# Patient Record
Sex: MALE | Race: WHITE | NOT HISPANIC OR LATINO | Employment: FULL TIME | URBAN - METROPOLITAN AREA
[De-identification: names, ages, dates, MRNs, and addresses within clinical notes are randomized per-mention and may not be internally consistent; named-entity substitution may affect disease eponyms.]

---

## 2021-05-04 ENCOUNTER — COMMUNICATION - HEALTHEAST (OUTPATIENT)
Dept: SCHEDULING | Facility: CLINIC | Age: 26
End: 2021-05-04

## 2021-05-04 ENCOUNTER — RECORDS - HEALTHEAST (OUTPATIENT)
Dept: LAB | Facility: CLINIC | Age: 26
End: 2021-05-04

## 2021-05-04 LAB
ALBUMIN SERPL-MCNC: 4.4 G/DL (ref 3.5–5)
ALP SERPL-CCNC: 46 U/L (ref 45–120)
ALT SERPL W P-5'-P-CCNC: 80 U/L (ref 0–45)
AST SERPL W P-5'-P-CCNC: 54 U/L (ref 0–40)
BILIRUB DIRECT SERPL-MCNC: 0.3 MG/DL
BILIRUB SERPL-MCNC: 1 MG/DL (ref 0–1)
IRON SATN MFR SERPL: 34 % (ref 20–50)
IRON SERPL-MCNC: 155 UG/DL (ref 42–175)
PROT SERPL-MCNC: 7.8 G/DL (ref 6–8)
TIBC SERPL-MCNC: 455 UG/DL (ref 313–563)
TRANSFERRIN SERPL-MCNC: 364 MG/DL (ref 212–360)

## 2021-05-05 LAB
HBV SURFACE AG SERPL QL IA: NEGATIVE
HCV AB SERPL QL IA: NEGATIVE
HEPATITIS B SURFACE ANTIBODY LHE- HISTORICAL: POSITIVE

## 2021-06-20 ENCOUNTER — HEALTH MAINTENANCE LETTER (OUTPATIENT)
Age: 26
End: 2021-06-20

## 2021-10-11 ENCOUNTER — HEALTH MAINTENANCE LETTER (OUTPATIENT)
Age: 26
End: 2021-10-11

## 2021-12-24 ENCOUNTER — IMMUNIZATION (OUTPATIENT)
Dept: NURSING | Facility: CLINIC | Age: 26
End: 2021-12-24
Payer: COMMERCIAL

## 2021-12-24 PROCEDURE — 0001A PR COVID VAC PFIZER DIL RECON 30 MCG/0.3 ML IM: CPT

## 2021-12-24 PROCEDURE — 91300 PR COVID VAC PFIZER DIL RECON 30 MCG/0.3 ML IM: CPT

## 2022-01-14 ENCOUNTER — IMMUNIZATION (OUTPATIENT)
Dept: NURSING | Facility: CLINIC | Age: 27
End: 2022-01-14
Attending: FAMILY MEDICINE
Payer: COMMERCIAL

## 2022-01-14 PROCEDURE — 91300 PR COVID VAC PFIZER DIL RECON 30 MCG/0.3 ML IM: CPT

## 2022-01-14 PROCEDURE — 0002A PR COVID VAC PFIZER DIL RECON 30 MCG/0.3 ML IM: CPT

## 2022-07-17 ENCOUNTER — HEALTH MAINTENANCE LETTER (OUTPATIENT)
Age: 27
End: 2022-07-17

## 2022-09-25 ENCOUNTER — HEALTH MAINTENANCE LETTER (OUTPATIENT)
Age: 27
End: 2022-09-25

## 2023-08-05 ENCOUNTER — HEALTH MAINTENANCE LETTER (OUTPATIENT)
Age: 28
End: 2023-08-05

## 2024-08-13 ENCOUNTER — HOSPITAL ENCOUNTER (EMERGENCY)
Facility: CLINIC | Age: 29
Discharge: HOME | End: 2024-08-13
Attending: EMERGENCY MEDICINE | Admitting: EMERGENCY MEDICINE
Payer: COMMERCIAL

## 2024-08-13 ENCOUNTER — NURSE TRIAGE (OUTPATIENT)
Dept: FAMILY MEDICINE | Facility: CLINIC | Age: 29
End: 2024-08-13
Payer: COMMERCIAL

## 2024-08-13 VITALS
WEIGHT: 190 LBS | DIASTOLIC BLOOD PRESSURE: 83 MMHG | TEMPERATURE: 98.7 F | HEART RATE: 74 BPM | SYSTOLIC BLOOD PRESSURE: 138 MMHG | RESPIRATION RATE: 18 BRPM | OXYGEN SATURATION: 99 %

## 2024-08-13 DIAGNOSIS — I10 HYPERTENSION, UNSPECIFIED TYPE: ICD-10-CM

## 2024-08-13 DIAGNOSIS — F10.10 ALCOHOL ABUSE: ICD-10-CM

## 2024-08-13 DIAGNOSIS — S36.119A LIVER INJURY, INITIAL ENCOUNTER: ICD-10-CM

## 2024-08-13 PROBLEM — F32.0 MDD (MAJOR DEPRESSIVE DISORDER), SINGLE EPISODE, MILD (H): Status: ACTIVE | Noted: 2024-08-13

## 2024-08-13 PROBLEM — F10.930 ALCOHOL WITHDRAWAL, UNCOMPLICATED (H): Status: ACTIVE | Noted: 2024-08-13

## 2024-08-13 PROBLEM — F41.1 GAD (GENERALIZED ANXIETY DISORDER): Status: ACTIVE | Noted: 2024-08-13

## 2024-08-13 LAB
ALBUMIN SERPL BCG-MCNC: 5.2 G/DL (ref 3.5–5.2)
ALP SERPL-CCNC: 58 U/L (ref 40–150)
ALT SERPL W P-5'-P-CCNC: 139 U/L (ref 0–70)
ANION GAP SERPL CALCULATED.3IONS-SCNC: 12 MMOL/L (ref 7–15)
AST SERPL W P-5'-P-CCNC: 256 U/L (ref 0–45)
BASOPHILS # BLD AUTO: 0.1 10E3/UL (ref 0–0.2)
BASOPHILS NFR BLD AUTO: 1 %
BILIRUB SERPL-MCNC: 1 MG/DL
BUN SERPL-MCNC: 8.4 MG/DL (ref 6–20)
CALCIUM SERPL-MCNC: 10.2 MG/DL (ref 8.8–10.4)
CHLORIDE SERPL-SCNC: 101 MMOL/L (ref 98–107)
CREAT SERPL-MCNC: 0.83 MG/DL (ref 0.67–1.17)
EGFRCR SERPLBLD CKD-EPI 2021: >90 ML/MIN/1.73M2
EOSINOPHIL # BLD AUTO: 0.2 10E3/UL (ref 0–0.7)
EOSINOPHIL NFR BLD AUTO: 5 %
ERYTHROCYTE [DISTWIDTH] IN BLOOD BY AUTOMATED COUNT: 12.5 % (ref 10–15)
ETHANOL SERPL-MCNC: <0.01 G/DL
GLUCOSE SERPL-MCNC: 100 MG/DL (ref 70–99)
HCO3 SERPL-SCNC: 27 MMOL/L (ref 22–29)
HCT VFR BLD AUTO: 41.4 % (ref 40–53)
HGB BLD-MCNC: 14.9 G/DL (ref 13.3–17.7)
IMM GRANULOCYTES # BLD: 0 10E3/UL
IMM GRANULOCYTES NFR BLD: 0 %
LIPASE SERPL-CCNC: 48 U/L (ref 13–60)
LYMPHOCYTES # BLD AUTO: 1.2 10E3/UL (ref 0.8–5.3)
LYMPHOCYTES NFR BLD AUTO: 31 %
MCH RBC QN AUTO: 33.3 PG (ref 26.5–33)
MCHC RBC AUTO-ENTMCNC: 36 G/DL (ref 31.5–36.5)
MCV RBC AUTO: 92 FL (ref 78–100)
MONOCYTES # BLD AUTO: 0.6 10E3/UL (ref 0–1.3)
MONOCYTES NFR BLD AUTO: 14 %
NEUTROPHILS # BLD AUTO: 2 10E3/UL (ref 1.6–8.3)
NEUTROPHILS NFR BLD AUTO: 49 %
NRBC # BLD AUTO: 0 10E3/UL
NRBC BLD AUTO-RTO: 0 /100
PLATELET # BLD AUTO: 242 10E3/UL (ref 150–450)
POTASSIUM SERPL-SCNC: 4.2 MMOL/L (ref 3.4–5.3)
PROT SERPL-MCNC: 8.2 G/DL (ref 6.4–8.3)
RBC # BLD AUTO: 4.48 10E6/UL (ref 4.4–5.9)
SODIUM SERPL-SCNC: 140 MMOL/L (ref 135–145)
WBC # BLD AUTO: 4 10E3/UL (ref 4–11)

## 2024-08-13 PROCEDURE — 83690 ASSAY OF LIPASE: CPT | Performed by: EMERGENCY MEDICINE

## 2024-08-13 PROCEDURE — 258N000003 HC RX IP 258 OP 636: Performed by: EMERGENCY MEDICINE

## 2024-08-13 PROCEDURE — 82077 ASSAY SPEC XCP UR&BREATH IA: CPT | Performed by: EMERGENCY MEDICINE

## 2024-08-13 PROCEDURE — 85049 AUTOMATED PLATELET COUNT: CPT | Performed by: EMERGENCY MEDICINE

## 2024-08-13 PROCEDURE — 250N000011 HC RX IP 250 OP 636: Performed by: EMERGENCY MEDICINE

## 2024-08-13 PROCEDURE — 80053 COMPREHEN METABOLIC PANEL: CPT | Performed by: EMERGENCY MEDICINE

## 2024-08-13 PROCEDURE — 99284 EMERGENCY DEPT VISIT MOD MDM: CPT | Mod: 25

## 2024-08-13 PROCEDURE — 96374 THER/PROPH/DIAG INJ IV PUSH: CPT

## 2024-08-13 PROCEDURE — 250N000013 HC RX MED GY IP 250 OP 250 PS 637: Performed by: EMERGENCY MEDICINE

## 2024-08-13 PROCEDURE — 96361 HYDRATE IV INFUSION ADD-ON: CPT

## 2024-08-13 PROCEDURE — 36415 COLL VENOUS BLD VENIPUNCTURE: CPT | Performed by: EMERGENCY MEDICINE

## 2024-08-13 RX ORDER — DIAZEPAM 5 MG
5 TABLET ORAL ONCE
Status: COMPLETED | OUTPATIENT
Start: 2024-08-13 | End: 2024-08-13

## 2024-08-13 RX ORDER — ONDANSETRON 4 MG/1
4 TABLET, ORALLY DISINTEGRATING ORAL EVERY 8 HOURS PRN
Qty: 10 TABLET | Refills: 0 | Status: SHIPPED | OUTPATIENT
Start: 2024-08-13 | End: 2024-08-13

## 2024-08-13 RX ORDER — ONDANSETRON 2 MG/ML
4 INJECTION INTRAMUSCULAR; INTRAVENOUS EVERY 30 MIN PRN
Status: DISCONTINUED | OUTPATIENT
Start: 2024-08-13 | End: 2024-08-13 | Stop reason: HOSPADM

## 2024-08-13 RX ORDER — DIAZEPAM 5 MG
5 TABLET ORAL EVERY 6 HOURS PRN
Qty: 12 TABLET | Refills: 0 | Status: SHIPPED | OUTPATIENT
Start: 2024-08-13

## 2024-08-13 RX ORDER — ONDANSETRON 4 MG/1
4 TABLET, ORALLY DISINTEGRATING ORAL EVERY 8 HOURS PRN
Qty: 10 TABLET | Refills: 0 | Status: SHIPPED | OUTPATIENT
Start: 2024-08-13

## 2024-08-13 RX ADMIN — DIAZEPAM 5 MG: 5 TABLET ORAL at 10:53

## 2024-08-13 RX ADMIN — SODIUM CHLORIDE 1000 ML: 9 INJECTION, SOLUTION INTRAVENOUS at 10:20

## 2024-08-13 RX ADMIN — ONDANSETRON 4 MG: 2 INJECTION INTRAMUSCULAR; INTRAVENOUS at 10:20

## 2024-08-13 ASSESSMENT — ACTIVITIES OF DAILY LIVING (ADL)
ADLS_ACUITY_SCORE: 35

## 2024-08-13 ASSESSMENT — LIFESTYLE VARIABLES
AUDITORY DISTURBANCES: NOT PRESENT
PAROXYSMAL SWEATS: NO SWEAT VISIBLE
HEADACHE, FULLNESS IN HEAD: VERY MILD
ORIENTATION AND CLOUDING OF SENSORIUM: ORIENTED AND CAN DO SERIAL ADDITIONS
NAUSEA AND VOMITING: MILD NAUSEA WITH NO VOMITING
VISUAL DISTURBANCES: NOT PRESENT
AGITATION: NORMAL ACTIVITY
ANXIETY: 3
TREMOR: MODERATE, WITH PATIENT'S ARMS EXTENDED
TOTAL SCORE: 9

## 2024-08-13 ASSESSMENT — COLUMBIA-SUICIDE SEVERITY RATING SCALE - C-SSRS
6. HAVE YOU EVER DONE ANYTHING, STARTED TO DO ANYTHING, OR PREPARED TO DO ANYTHING TO END YOUR LIFE?: NO
2. HAVE YOU ACTUALLY HAD ANY THOUGHTS OF KILLING YOURSELF IN THE PAST MONTH?: NO
1. IN THE PAST MONTH, HAVE YOU WISHED YOU WERE DEAD OR WISHED YOU COULD GO TO SLEEP AND NOT WAKE UP?: NO

## 2024-08-13 NOTE — ED PROVIDER NOTES
EMERGENCY DEPARTMENT ENCOUNTER            IMPRESSION:  Alcohol abuse  Hypertension secondary to alcohol abuse  Liver injury secondary to alcohol abuse        MEDICAL DECISION MAKING:  It was my pleasure to provide care for Noe Herman who presented for evaluation of symptoms secondary to alcohol withdrawal    On my exam patient is pleasant and cooperative.   Vital signs show hypertension.  Physical exam notable for patient is awake and alert.  There is no tremor..     Valium and IV fluid administered for symptom relief.      Laboratory investigation independently interpreted by myself and notable for elevated liver function test.  Negative BAL    DEC was consulted for chemical dependency    Patient was reevaluated and results were discussed.     ED evaluation is consistent with alcohol abuse disorder.      I recommended outpatient follow-up.  Zofran prescribed      Prior to making a final disposition on this patient the results of patient's tests and other diagnostic studies were discussed with the patient. All questions were answered. Patient expressed understanding of the plan and was amenable.    Return precautions and follow-up were discussed.     =================================================================  CHIEF COMPLAINT:  Chief Complaint   Patient presents with    Vomiting    Alcohol Problem         HPI  Noe Herman is a 28 year old male with a history of post concussive syndrome who presents to the ED by private car for evaluation of an alcohol problem.    The patient reports that he has recently been binge-drinking, going through a 750 ml bottle of vodka every two days this past week. Most recent drink was yesterday evening around 7 PM. Since he stopped drinking, he endorses nausea, vomiting, dehydration, and anxiety. The patient notes a history of high blood pressure and reports a recent severe headache as well as bilateral arm tingling. This, along with his other symptoms, led him to call  the triage Nurse this morning who recommended he come to the ED for further evaluation. He first tried taking hydroxyzine as well as some dramamine, but this made him feel as if he was going to pass out so they decided to come in. No other concerns at this time.    REVIEW OF SYSTEMS  Constitutional: Does not report chills, unintentional weight loss or fatigue. Positive for dehydration and anxiety.  Eyes: Does not report visual changes or discharge    HENT: Does not report sore throat, ear pain or neck pain  Respiratory: Does not report cough or shortness of breath    Cardiovascular: Does not report chest pain, palpitations or leg swelling  GI: Does not report abdominal pain or dark, bloody stools. Positive for nausea and vomiting.  : Does not report hematuria, dysuria, or flank pain  Musculoskeletal: Does not report any new musculoskeletal pain or new muscle/joint pains  Skin: Does not report rash or wound  Neurologic: Does not report new weakness, focal weakness. Positive for a headache and bilateral arm tingling.      Remainder of systems reviewed, unless noted in HPI all others negative.      PAST MEDICAL HISTORY:  Past Medical History:   Diagnosis Date    Concussion        PAST SURGICAL HISTORY:  History reviewed. No pertinent surgical history.      CURRENT MEDICATIONS:    ibuprofen (ADVIL,MOTRIN) 200 MG tablet        ALLERGIES:  No Known Allergies    FAMILY HISTORY:  History reviewed. No pertinent family history.    SOCIAL HISTORY:   Social History     Socioeconomic History    Marital status:    Tobacco Use    Smoking status: Never       PHYSICAL EXAM:    BP (!) 148/92   Pulse 86   Temp 98.7  F (37.1  C) (Temporal)   Resp 18   Wt 86.2 kg (190 lb)   SpO2 100%     Constitutional: Awake, alert, he is pleasant and cooperative  Head: Normocephalic, atraumatic.  ENT: Mucous membranes are moist.  No pallor.   Eyes: Pupils are reactive.  No discoloration.  No nystagmus  Neck: No lymphadenopathy, no  stridor, supple, no soft tissue swelling  Chest: No tenderness   Respiratory: Respirations even, unlabored. Lungs clear to ascultation bilaterally, in no acute respiratory distress.  Cardiovascular: Regular rate and rhythm.  Good overall perfusion.  Upper and lower extremity pulses are equal.  GI: Abdomen soft, non-tender to palpation.  No guarding or rebound. Bowel sounds present throughout.   Back: No CVA tenderness.    Musculoskeletal: No tremor  Integument: Warm, dry. No rash. No bruising or petechiae.  Neurologic: Alert & oriented x 3. Normal speech. Grossly normal motor and sensory function. No focal deficits noted.  Cranial nerves intact. Steady gait and station. NIHSS = 0  Psychiatric: Normal mood and affect.  Appropriate judgement.    ED COURSE:  10:03 AM Met with and introduced myself to the patient. Discussed history and plan of care.    Medical Decision Making    History:  Supplemental history from: Patient's wife  External Record(s) reviewed: External medical records including care everywhere reviewed.  Reviewed [facility] Nurse Triage note from 8/13/2024 where the patient called for evaluation of an alcohol-related problem. Patient reported trying to stop drinking Since Sunday (8/11) and woke up this morning feeling anxious. Endorses nausea and dehydration. No SI. Recommended patient go to the ED for further evaluation.     Work Up:  Laboratory studies as ordered were independently interpreted by myself.   Broad differential diagnosis considered for alcohol withdrawal  The patient's presentation was of high complexity.     External consultation:  Discussion of management with another provider: Chemical dependency    Complicating factors:  Patient has a complicated past medical history including post-concussive syndrome.  Care affected by social determinants of health: Access to primary care    Disposition involved shared decision-making with the patient.      The patient was prescribed  medication Zofran    Patient otherwise to continue outpatient medications as prescribed.       LAB:  Laboratory results were independently reviewed and interpreted  Results for orders placed or performed during the hospital encounter of 08/13/24   Comprehensive metabolic panel   Result Value Ref Range    Sodium 140 135 - 145 mmol/L    Potassium 4.2 3.4 - 5.3 mmol/L    Carbon Dioxide (CO2) 27 22 - 29 mmol/L    Anion Gap 12 7 - 15 mmol/L    Urea Nitrogen 8.4 6.0 - 20.0 mg/dL    Creatinine 0.83 0.67 - 1.17 mg/dL    GFR Estimate >90 >60 mL/min/1.73m2    Calcium 10.2 8.8 - 10.4 mg/dL    Chloride 101 98 - 107 mmol/L    Glucose 100 (H) 70 - 99 mg/dL    Alkaline Phosphatase 58 40 - 150 U/L     (H) 0 - 45 U/L     (H) 0 - 70 U/L    Protein Total 8.2 6.4 - 8.3 g/dL    Albumin 5.2 3.5 - 5.2 g/dL    Bilirubin Total 1.0 <=1.2 mg/dL   Result Value Ref Range    Lipase 48 13 - 60 U/L   CBC with platelets and differential   Result Value Ref Range    WBC Count 4.0 4.0 - 11.0 10e3/uL    RBC Count 4.48 4.40 - 5.90 10e6/uL    Hemoglobin 14.9 13.3 - 17.7 g/dL    Hematocrit 41.4 40.0 - 53.0 %    MCV 92 78 - 100 fL    MCH 33.3 (H) 26.5 - 33.0 pg    MCHC 36.0 31.5 - 36.5 g/dL    RDW 12.5 10.0 - 15.0 %    Platelet Count 242 150 - 450 10e3/uL    % Neutrophils 49 %    % Lymphocytes 31 %    % Monocytes 14 %    % Eosinophils 5 %    % Basophils 1 %    % Immature Granulocytes 0 %    NRBCs per 100 WBC 0 <1 /100    Absolute Neutrophils 2.0 1.6 - 8.3 10e3/uL    Absolute Lymphocytes 1.2 0.8 - 5.3 10e3/uL    Absolute Monocytes 0.6 0.0 - 1.3 10e3/uL    Absolute Eosinophils 0.2 0.0 - 0.7 10e3/uL    Absolute Basophils 0.1 0.0 - 0.2 10e3/uL    Absolute Immature Granulocytes 0.0 <=0.4 10e3/uL    Absolute NRBCs 0.0 10e3/uL   Ethyl Alcohol Level   Result Value Ref Range    Alcohol ethyl <0.01 <=0.01 g/dL               MEDICATIONS GIVEN IN THE EMERGENCY:  Medications   ondansetron (ZOFRAN) injection 4 mg (4 mg Intravenous $Given 8/13/24 1020)    sodium chloride 0.9% BOLUS 1,000 mL (0 mLs Intravenous Stopped 8/13/24 1320)   diazepam (VALIUM) tablet 5 mg (5 mg Oral $Given 8/13/24 1053)           NEW PRESCRIPTIONS STARTED AT TODAY'S ER VISIT:  New Prescriptions    No medications on file                FINAL DIAGNOSIS:    ICD-10-CM    1. Alcohol abuse  F10.10       2. Hypertension, unspecified type  I10       3. Liver injury, initial encounter  S36.119A                  NAME: Noe Herman  AGE: 28 year old male  YOB: 1995  MRN: [id number]  EVALUATION DATE & TIME: No admission date for patient encounter.    PCP: Jocelin Guillory    ED PROVIDER: Boubacar Centeno M.D.      ILaina, am serving as a scribe to document services personally performed by Dr. Boubacar Centeno based on my observation and the provider's statements to me. I, Boubacar Centeno MD attest that Laina Larry is acting in a scribe capacity, has observed my performance of the services and has documented them in accordance with my direction.    Boubacar Centeno M.D.  Emergency Medicine  [facility] EMERGENCY ROOM  [address] [id number] [phone]  Dept: [phone]  8/13/2024         Boubacar Centeno MD  08/17/24 0718

## 2024-08-13 NOTE — CONSULTS
Diagnostic Evaluation Consultation  Crisis Assessment    Patient Name: Noe Herman  Age:  28 year old  Legal Sex: male  Gender Identity: male  Pronouns:   Race: White  Ethnicity: Not  or   Language: English      Patient was assessed: Phone   Crisis Assessment Start Date: 08/13/24  Crisis Assessment Start Time: 1452  Crisis Assessment Stop Time: 1518  Patient location: [facility] EMERGENCY ROOM                                 Referral Data and Chief Complaint  Noe Herman presents to the ED with family/friends. Patient is presenting to the ED for the following concerns: Health stressors, Depression, Significant behavioral change, Anxiety, Substance use, Recent loss.   Factors that make the mental health crisis life threatening or complex are:  Pt has been drinking daily and having withdrawal issues, including feeling anxious, dizzy, high blood pressure, and shakiness.  Pt had recent stressors including moving to a new house and death in the family.  Pt has no current outpatient mental health service providers..      Informed Consent and Assessment Methods  Explained the crisis assessment process, including applicable information disclosures and limits to confidentiality, assessed understanding of the process, and obtained consent to proceed with the assessment.  Assessment methods included conducting a formal interview with patient, review of medical records, collaboration with medical staff, and obtaining relevant collateral information from family and community providers when available.  : done     Patient response to interventions: eager to participate, acceptance expressed, verbalizes understanding  Coping skills were attempted to reduce the crisis:  fishing, hunting, playing golf, taking walks, watching TV, movies and listening to music     History of the Crisis   Pt is a 28 year old White male with history of depression, anxiety and alcohol abuse.  Pt was  "brought to the ER today by his wife due to worsening of feeling dizzy, high blood pressure, panic attacks and anxiety.  Pt stated, \"Everytime I stood up, I got dizzy and my blood pressure was high.\" as his reason for visiting the ER today.  Pt noted he has history of hypertension as this happened to him before.  Pt also identified ongoing struggle with alcohol abuse, stress of moving recently and death in the family as triggers leading to his current mental health crisis.  Pt shared his brother's father in-law whom he was closed to .  Pt reported he has been drinking 750ml of Vodka daily and rarely using THC drink.  Pt endorsed baseline depression but increased anxiety.  Pt reported having poor sleep but normal appetite.  Pt denied having paranoia and visual hallucination.  Pt endorsed visual hallucination of seeing someone walking by when he was at home alone or at work.  Pt denied having suicidal and homicidal ideations.  Pt reported having access to firearms.  Pt denied having SIB and previous suicide attempt.    Brief Psychosocial History  Family:  , Children yes  Support System:  Wife  Employment Status:  employed full-time  Source of Income:  salary/wages  Financial Environmental Concerns:  none  Current Hobbies:  exercise/fitness, music, social media/computer activities, television/movies/videos, outdoor activities, family functions, arts/crafts  Barriers in Personal Life:  behavioral concerns, mental health concerns, emotional concerns, medical conditions/precautions    Significant Clinical History  Current Anxiety Symptoms:  panic attack, anxious, racing thoughts, excessive worry  Current Depression/Trauma:  sadness, impaired decision making, withdrawl/isolation, crying or feels like crying  Current Somatic Symptoms:  excessive worry, anxious, racing thoughts  Current Psychosis/Thought Disturbance:  impulsive, high risk behavior, visual hallucinations  Current Eating Symptoms:     Chemical Use " History:  Alcohol: Daily, Binge  Last Use:: 08/12/24  Benzodiazepines: None  Opiates: None  Cocaine: None  Marijuana: Occasional (Pt reported drinking THC beverage about 1x 2 months and his last THC drink was about a month ago.)  Other Use: None  Withdrawal Symptoms: Nausea, Other (comments) (vomiting, dizzy and tingling.)   Past diagnosis:  Anxiety Disorder, Substance Use Disorder  Family history:  Substance Use Disorder  Past treatment:  Psychiatric Medication Management  Details of most recent treatment:  Pt reported no recent treatment.  Pt has no current outpatient mental health service providers.  Pt has no history of detox service, CD treatment and psychiatric hospitalization.  Other relevant history:  Pt shared his parents were , has 2 brothers and 1 sister.  Pt reported he was  with 2 children and worked full-time as sub contractor for [employer].  Pt identified hypertension as his medical condition.  Pt denied history of legal issues and being abused.       Collateral Information  Is there collateral information: Yes     Collateral information name, relationship, phone number:  Wife, Yancy Herman [phone]    What happened today: Yancy reported Pt has ongoing drinking problem.  Yancy reported Pt has been binge drinking for past 2 days and he tried to slow down yesterday as he had to go to work.  Yancy reported Pt started to have withdrawal symptoms, including shaking, shievering, tingling and nausea which lead him to the ER visit today.     What is different about patient's functioning: Yancy reported Pt has been lying about things, hiding from her and being late to work due to drinking problem.     Concern about alcohol/drug use:      What do you think the patient needs:      Has patient made comments about wanting to kill themselves/others: no    If d/c is recommended, can they take part in safety/aftercare planning:  yes    Additional collateral information:  Yancy  reviewed and agreed with outpatient service recommendations as she felt safe taking Pt back home today.     Risk Assessment  [place] Suicide Severity Rating Scale Full Clinical Version:  Suicidal Ideation  Q1 Wish to be Dead (Lifetime): No  Q2 Non-Specific Active Suicidal Thoughts (Lifetime): No  Q6 Suicide Behavior (Lifetime): no     Suicidal Behavior (Lifetime)  Actual Attempt (Lifetime): No  Has subject engaged in non-suicidal self-injurious behavior? (Lifetime): No  Interrupted Attempts (Lifetime): No  Aborted or Self-Interrupted Attempt (Lifetime): No  Preparatory Acts or Behavior (Lifetime): No    [place] Suicide Severity Rating Scale Recent:   Suicidal Ideation (Recent)  Q1 Wished to be Dead (Past Month): no  Q2 Suicidal Thoughts (Past Month): no  Level of Risk per Screen: no risks indicated     Suicidal Behavior (Recent)  Actual Attempt (Past 3 Months): No  Has subject engaged in non-suicidal self-injurious behavior? (Past 3 Months): No  Interrupted Attempts (Past 3 Months): No  Aborted or Self-Interrupted Attempt (Past 3 Months): No  Preparatory Acts or Behavior (Past 3 Months): No    Environmental or Psychosocial Events: loss of a loved one, helplessness/hopelessness, impulsivity/recklessness, other life stressors, recent life events (see comment), ongoing abuse of substances  Protective Factors: Protective Factors: intact marriage or domestic partnership, responsibilities and duties to others, including pets and children, lives in a responsibly safe and stable environment, help seeking, strong bond to family unit, community support, or employment, constructive use of leisure time, enjoyable activities, resilience, reality testing ability    Does the patient have thoughts of harming others? Feels Like Hurting Others: no  Previous Attempt to Hurt Others: no  Current presentation:  (Pt was calm, alert, oriented, engaged and cooperative.)  Is the patient engaging in sexually inappropriate behavior?:  no    Is the patient engaging in sexually inappropriate behavior?  no        Mental Status Exam   Affect: Other (unable to assess due to phone assessment.)  Appearance: Other (please comment) (unable to assess due to phone assessment.)  Attention Span/Concentration: Attentive  Eye Contact: Other (please comment) (unable to assess due to phone assessment.)    Fund of Knowledge: Appropriate   Language /Speech Content: Fluent  Language /Speech Volume: Normal  Language /Speech Rate/Productions: Normal  Recent Memory: Variable  Remote Memory: Variable  Mood: Anxious, Depressed, Sad, Apathetic  Orientation to Person: Yes   Orientation to Place: Yes  Orientation to Time of Day: Yes  Orientation to Date: Yes     Situation (Do they understand why they are here?): Yes  Psychomotor Behavior: Other (please comment) (unable to assess due to phone assessment.)  Thought Content: Clear, Hallucinations  Thought Form: Intact     Mini-Cog Assessment  Number of Words Recalled:    Clock-Drawing Test:     Three Item Recall:    Mini-Cog Total Score:       Medication  Psychotropic medications:   Medication Orders - Psychiatric (From admission, onward)      Start     Dose/Rate Route Frequency Ordered Stop    08/13/24 0000  diazepam (VALIUM) 5 MG tablet         5 mg Oral EVERY 6 HOURS PRN 08/13/24 1530               Current Care Team  Patient Care Team:  Jocelin Guillory MD as PCP - General (Family Practice)    Diagnosis  Patient Active Problem List   Diagnosis Code    MDD (major depressive disorder), single episode, mild (H24) F32.0    YOSI (generalized anxiety disorder) F41.1    Alcohol withdrawal, uncomplicated (H) F10.930       Primary Problem This Admission  Active Hospital Problems    YOSI (generalized anxiety disorder)      MDD (major depressive disorder), single episode, mild (H24)      Alcohol withdrawal, uncomplicated (H)        Clinical Summary and Substantiation of Recommendations   Pt presenting in the ER today due  to worsening of panic attacks, anxiety, alcohol abuse, dizziness and hypertension.  Pt reported he has been drinking alcohol daily and binge drinking for last 2 days.  Pt shared drinking alcohol for recreational, social and stress management reasons.  Pt also identified ongoing struggle with alcohol abuse, stress of moving recently and death in the family as triggers leading to his current mental health crisis. Pt shared his brother's father in-law whom he was closed to . Pt reported he has been drinking 750ml of Vodka daily and rarely using THC drink. Pt endorsed baseline depression but increased anxiety. Pt reported having poor sleep but normal appetite. Pt denied having paranoia and visual hallucination. Pt endorsed visual hallucination of seeing someone walking by when he was at home alone or at work. Pt denied having suicidal and homicidal ideations. Pt reported having access to firearms. Pt denied having SIB and previous suicide attempt.  Pt was able to engage in his [facility] Aftercare plan as he felt safe to return home.  Pt was not imminent danger to himself or to others.  Pt was able to identify his coping skills and support system to mitigate his current mental health crisis.  Pt was appropriate for CD Assessment/treatment with outpatient mental health service providers.                          Patient coping skills attempted to reduce the crisis:  fishing, hunting, playing golf, taking walks, watching TV, movies and listening to music    Disposition  Recommended disposition: Individual Therapy, Medication Management, Substance Abuse Disorder Treatment, Rule 25/AB Assessment        Reviewed case and recommendations with attending provider. Attending Name: Heaven Yates MD       Attending concurs with disposition: yes       Patient and/or validated legal guardian concurs with disposition:   yes       Final disposition:  discharge    Legal status on admission:      Assessment Details   Total duration  spent with the patient: 26 min     CPT code(s) utilized: Non-Billable    DEBRA Biggs, Psychotherapist  [facility] - Triage & Transition Services  Callback: [phone]

## 2024-08-13 NOTE — ED TRIAGE NOTES
"Pt with hx of drinking approx a small bottle of vodka a day but cuts back during the week c/o vomiting since Monday morning after drinking more alcohol than normal related to a family death. Last drink yesterday at 6pm. Pt also reports SOB for a few days that is worse when he pays attention to his breathing since the family . Chest feels heavy but no pain since yesterday. Called triage nurse this morning and took dramamine and old hydroxide after talking to triage nurse today for bilateral arm tingling starting yesterday which is similar when he detoxes. After taking the medications he has been having lightheadedness and HA. Has been having headaches since Friday even before the death of the family member. Pt reports neck, arms, and full body cramping for \"a while\".       Triage Assessment (Adult)       Row Name [id number]          Triage Assessment    Airway WDL WDL        Respiratory WDL    Respiratory WDL X;rhythm/pattern     Rhythm/Pattern, Respiratory shortness of breath  SOB for past few days constantly when paying attention to breathing        Skin Circulation/Temperature WDL    Skin Circulation/Temperature WDL WDL        Cardiac WDL    Cardiac WDL WDL        Peripheral/Neurovascular WDL    Peripheral Neurovascular WDL WDL        Cognitive/Neuro/Behavioral WDL    Cognitive/Neuro/Behavioral WDL WDL                     "

## 2024-08-13 NOTE — ED PROVIDER NOTES
EMERGENCY DEPARTMENT SIGNOUT NOTE    Patient signed out to me from Dr. Centeno.      Noe Herman is a 28 year old male          RADIOLOGY/LABS:  Reviewed all pertinent imaging. Please see official radiology report. All pertinent labs reviewed and interpreted.    Results for orders placed or performed during the hospital encounter of 08/13/24   Comprehensive metabolic panel   Result Value Ref Range    Sodium 140 135 - 145 mmol/L    Potassium 4.2 3.4 - 5.3 mmol/L    Carbon Dioxide (CO2) 27 22 - 29 mmol/L    Anion Gap 12 7 - 15 mmol/L    Urea Nitrogen 8.4 6.0 - 20.0 mg/dL    Creatinine 0.83 0.67 - 1.17 mg/dL    GFR Estimate >90 >60 mL/min/1.73m2    Calcium 10.2 8.8 - 10.4 mg/dL    Chloride 101 98 - 107 mmol/L    Glucose 100 (H) 70 - 99 mg/dL    Alkaline Phosphatase 58 40 - 150 U/L     (H) 0 - 45 U/L     (H) 0 - 70 U/L    Protein Total 8.2 6.4 - 8.3 g/dL    Albumin 5.2 3.5 - 5.2 g/dL    Bilirubin Total 1.0 <=1.2 mg/dL   Result Value Ref Range    Lipase 48 13 - 60 U/L   CBC with platelets and differential   Result Value Ref Range    WBC Count 4.0 4.0 - 11.0 10e3/uL    RBC Count 4.48 4.40 - 5.90 10e6/uL    Hemoglobin 14.9 13.3 - 17.7 g/dL    Hematocrit 41.4 40.0 - 53.0 %    MCV 92 78 - 100 fL    MCH 33.3 (H) 26.5 - 33.0 pg    MCHC 36.0 31.5 - 36.5 g/dL    RDW 12.5 10.0 - 15.0 %    Platelet Count 242 150 - 450 10e3/uL    % Neutrophils 49 %    % Lymphocytes 31 %    % Monocytes 14 %    % Eosinophils 5 %    % Basophils 1 %    % Immature Granulocytes 0 %    NRBCs per 100 WBC 0 <1 /100    Absolute Neutrophils 2.0 1.6 - 8.3 10e3/uL    Absolute Lymphocytes 1.2 0.8 - 5.3 10e3/uL    Absolute Monocytes 0.6 0.0 - 1.3 10e3/uL    Absolute Eosinophils 0.2 0.0 - 0.7 10e3/uL    Absolute Basophils 0.1 0.0 - 0.2 10e3/uL    Absolute Immature Granulocytes 0.0 <=0.4 10e3/uL    Absolute NRBCs 0.0 10e3/uL   Ethyl Alcohol Level   Result Value Ref Range    Alcohol ethyl <0.01 <=0.01 g/dL     ED COURSE :      ED Course as of  08/13/24 1531   Tue Aug 13, 2024   1529 Spoke w Sajan, DEC. Recent stressors after family member passed away, recently moved to [facility]. Struggling w etoh. No SI/HI. Ok to safety plan and home w wife.  New chem dep treatment appointment.        FINAL IMPRESSION:  1. Alcohol abuse    2. Hypertension, unspecified type    3. Liver injury, initial encounter    Heaven Yates MD  Emergency Medicine  [facility] EMERGENCY ROOM  [address] [id number] [phone]  Dept: [phone]     Heaven Yates MD  08/13/24 1531

## 2024-08-13 NOTE — TELEPHONE ENCOUNTER
Nurse Triage SBAR    Is this a 2nd Level Triage? Yes- patient is not established at     Situation: Noe is feeling increased anxiety.  He is withdrawing from alcohol (last drank on Sunday).      Background: Noe is trying to stop drinking since Sunday.  He awoke this morning feeling very anxious.     Assessment: Anxiety that he rates a 9.  He does not feel like he wants to harm himself or others.  He is nauseated and very dehydrated.  He has chest discomfort, tingling in his arms, headache, feels shaky and has diarrhea.  He denies abdominal pain, breathing difficulty and sounds alert and oriented, speaking clearly.      Protocol Recommended Disposition:   Go To ED/UCC Now (Or To Office With PCP Approval)    Recommendation: Patient told to go to ER.  He will go to [facility] at .  Explained that it will open at 10:00 am.  His wife will drive.     Patient agreeable to being seen, he will go to the [facility] at [facility]    Does the patient meet one of the following criteria for ADS visit consideration? 16+ years old, with an [facility] PCP     TIP  Providers, please consider if this condition is appropriate for management at one of our Acute and Diagnostic Services sites.     If patient is a good candidate, please use dotphrase <dot>triageresponse and select Refer to ADS to document.     Alcohol withdrawal symptoms.  Headaches, arms are tingling, increased anxiety, BP has been high.    Symptoms of headache, arms are tingling, chest discomfort, nausea, dry mouth.    Reason for Disposition   Alcohol or drug use, known or suspected, and feeling very shaky (i.e., visible tremors of hands)    Additional Information   Negative: SEVERE difficulty breathing (e.g., struggling for each breath, speaks in single words)   Negative: Bluish (or gray) lips or face   Negative: Difficult to awaken or acting confused (e.g., disoriented, slurred speech)   Negative: Hysterical or combative behavior   Negative: Sounds like a life-threatening  "emergency to the triager   Negative: Chest pain   Negative: Palpitations, skipped heartbeat, or rapid heartbeat is main symptom   Negative: Cough is main symptom   Negative: Suicide thoughts, threats, attempts, or questions   Negative: Depression is main problem or symptom (e.g., feelings of sadness or hopelessness)   Negative: Difficulty breathing and persists > 10 minutes and not relieved by reassurance provided by triager   Negative: Lightheadedness or dizziness and persists > 10 minutes and not relieved by reassurance provided by triager    Answer Assessment - Initial Assessment Questions  1. CONCERN: \"Did anything happen that prompted you to call today?\"       Arms are tingling, chest discomfort, headache, nausea and dry mouth.  He is trying to cut alcohol.  2. ANXIETY SYMPTOMS: \"Can you describe how you (your loved one; patient) have been feeling?\" (e.g., tense, restless, panicky, anxious, keyed up, overwhelmed, sense of impending doom).       anxious  3. ONSET: \"How long have you been feeling this way?\" (e.g., hours, days, weeks)      Since he awoke at 0600.  4. SEVERITY: \"How would you rate the level of anxiety?\" (e.g., 0 - 10; or mild, moderate, severe).      9  5. FUNCTIONAL IMPAIRMENT: \"How have these feelings affected your ability to do daily activities?\" \"Have you had more difficulty than usual doing your normal daily activities?\" (e.g., getting better, same, worse; self-care, school, work, interactions)      Yes just the nausea that is slowing him down.  6. HISTORY: \"Have you felt this way before?\" \"Have you ever been diagnosed with an anxiety problem in the past?\" (e.g., generalized anxiety disorder, panic attacks, PTSD). If Yes, ask: \"How was this problem treated?\" (e.g., medicines, counseling, etc.)      He has had increased anxiety since he stopped drinking since Sunday  7. RISK OF HARM - SUICIDAL IDEATION: \"Do you ever have thoughts of hurting or killing yourself?\" If Yes, ask:  \"Do you have these " "feelings now?\" \"Do you have a plan on how you would do this?\"      no  8. TREATMENT:  \"What has been done so far to treat this anxiety?\" (e.g., medicines, relaxation strategies). \"What has helped?\"      Hydroxyzine 25 mg   9. TREATMENT - THERAPIST: \"Do you have a counselor or therapist? Name?\"      No, just talking to family  10. POTENTIAL TRIGGERS: \"Do you drink caffeinated beverages (e.g., coffee, janene, teas), and how much daily?\" \"Do you drink alcohol or use any drugs?\" \"Have you started any new medicines recently?\"        Withdrawal from alcohol  11. PATIENT SUPPORT: \"Who is with you now?\" \"Who do you live with?\" \"Do you have family or friends who you can talk to?\"         Wife, family and boss  12. OTHER SYMPTOMS: \"Do you have any other symptoms?\" (e.g., feeling depressed, trouble concentrating, trouble sleeping, trouble breathing, palpitations or fast heartbeat, chest pain, sweating, nausea, or diarrhea)        Sleeping poorly, palpitations, chest discomfort, nausea, diarrhea,    Protocols used: Anxiety and Panic Attack-A-[place]    "

## 2024-08-13 NOTE — DISCHARGE INSTRUCTIONS
Zofran prescribed for nausea.  Valium as needed for withdrawal.   Recommend outpatient chemical dependency  Follow-up for liver recheck    Chemical Dependency Treatment    [facility]  www.[place].org/Services/BehavioralHealth    [phone] or [phone]    [facility]    Services include screening assessments, medically supervised detoxification, inpatient and outpatient evaluation and referral, combined inpatient to outpatient treatment sequences, family counseling and aftercare.         [place] [facility]  www.[facility].org    [phone] [address] [facility]    Serves adults and teens (minimum age is 16); has short-term treatment and a long-term recovery program; uses 12-step, cognitive behavioral therapy, motivational enhancement; faithbased, and recovery management; high school on-site and Foxfly programming available         [facility]  www.Vringo/psy/INRFOODavenuecenter    [phone]    [facility] s Programs  [address] [facility]        [facility]  www.sixdimensionsAposense    [phone]    [address] [facility] [address] (Inpatient & Outpatient)    http://www.[facility].org/    [phone]    Phone consultation available 24/7    In-person Assessments    [address] [facility] [address] [address]    [address] [place], [place] [address]    [address] [place] [address] [address] [place] [address] [address] [place] [address] (Inpatient & Outpatient)    http://www.healtheast.org/mental-health-addiction/about.html    [place], [place]    Contact  for Chemical Health Evaluation, [phone]    Funded by: Rule 25 in [facility]. Medical Assistance (MA) providers of [facility], [facility] [employer], Blue Cross, PreferredOne, Medica, Medicare A&B. Private insurance reviewed case by case.          [facility] (Ricarda-Based Inpatient & Outpatient)    http://lucierna.Red Ambiental/    [phone]    [address] [place] [address]         [facility]    http://www.[facility]//specialties/mental-health/adap.html    [phone]    Alcohol and Drug Abuse Program - [place]  [address], [address]  [place] [address]    Assessments are available during the day and on a walk-in basis Monday-Friday starting at 8am.         [facility]    [phone]    http://Ornim Medical.Red Ambiental/    [address] [place] [address]         [facility] (Residential & Outpatient)    http://[facility].org/    Women s Programs: [phone], [address], [place] [address]         [facility] (Does Rule 25 Assessments)    http://www.[facility].org/    [phone]    [address], [address], [place] [address]         [place]    http://www.[email or web address]/    [phone]    [address] [place] [address]    [address] [place] [address]    Rule 25 Assessments, Substance Abuse Assessments, Men s & Women s Programs         [facility]    https://www.C[employer]ch [employer] Systems.Red Ambiental/our-services/drug-alcohol-treatment    [phone], [address], [place] [address]    [phone], [address], [place], [place] [address] [phone], [address] [facility], [address], [place], [place] [address] [phone], [address], ([facility]), [place], [place] [id number] [phone], [address] [place] [address]         National [place] on Drug Abuse    https://www.drugabuse.gov/nidamed-medical-health-professionals      Substance Use Treatment (Rule 25) Information -     To access chemical dependency treatment you must have an assessment complete or have an updatedassessment within 30 days of starting any  program.    Information for this to be completed and to secure funding if you have medical assistance or no insurance can be found through your [facility]'s [employer] Eclipse health intakeline.     If you have private insurance contact the customer service number on the back of your insurance card to determine the required steps for accessingservices through your insurance provider.     [facility] RULE 25 INFORMATION -   [place] - [phone]  [place] - [phone]  [facility] [phone]  [phone]  [facility] [phone]  [facility] [phone]  [place] - [phone]  [facility] [phone]    Once approved for funding you canconnect with a facility that does Rule 25 assessment.     The following facilities offer Rule 25 assessments -     [facility]  [phone]  [facility] - Outpatient Mental Health and Addiction   [address] [place] [address]    [facility] Outpatient Alcohol and Drug Abuse Program (ADAP)  [phone]  [address] [place] [address]  *Walk in assessments also available M-F starting at 8 am.     [facility]  [facility]   [phone]  [facility]  [address] [facility] [address]    [facility]   [phone]  [address] [place] [address]    [facility]  [phone] - wumlc 1clinic locations    [facility]   [phone]  [address] [place] [address] E  [facility]    Clues ([facility])  [phone]  [address] [facility]    [facility]  [phone]  [address] [facility] [address]    If youare intoxicated  You may be required to detox at a detox facility before starting treatment.    [facility]- [address] [place] [address].  [place], [place].    [phone]    [facility]: [phone]  [facility]: [phone]  [facility]: [phone]  [place]     *All information subject to change by facility.     Writer encourage Pt to  take his medications as prescribed and keep all of scheduled appointments with his outpatient service providers.  Writer recommended Pt to engage in CD Assessment/treatment to promote sobriety.  Writer recommended Pt to engage in new outpatient psychiatry for medication management and individual therapy service to improve coping skills.  DEC coordinator will  contact Pt within next 1 or 2 business days to ensure coordination of care and provide assistance with appointments.    Below is a list of FREE Mental Health Options in the [facility]:    [facility] ([facility])  Serves those in emotional crisis with 24-hour, seven-day-a-week crisis counseling, assessment, referral, and medication management.   Suicidal: [phone] Consultation: [phone]  [address] [place] [address], 24/7 Crisis Intervention Center     Walk-in Counseling Center  [phone]  Serves those in need of free outpatient mental health care  Hours: Mon, Wed, Fri 1-3pm; Mon-Thurs 6:30-8:30pm    [facility]gent [facility]  [address] [place] [address]  [phone]     Substance Use Disorder Direct Access Resources    It is recommended that you abstain from all mood altering chemicals. Please contact the sober support hotline ([phone]) as needed; phones are answered 24 hours a day, 7 days a week.    To access substance use treatment you must have a comprehensive assessment completed to begin any treatment program.     If uninsured, please contact your county of residence for eligibility screen to substance use disorder evaluation and treatment:    [place] - [phone]   [facility] [phone]   [facility] [phone]   [facility] [phone]   [facility] [phone]   [facility] [phone]   [facility] [phone]   [place] - [phone]     If you have private insurance, call the customer service number on the back of your insurance card to find an in-network substance abuse use disorder assessment. The  ideal provider will be a treatment facility, licensed in the state [facility].     Community AB Evaluations: Clients may call their county for a full list of providers - Availability and services listed belo are subject to change, please call the provider to confirm    [facility]  [phone]  [address] [place] [address], [address]  *Please call the above number to schedule a comprehensive assessment for determination of level of care needs. In person and virtual appointments available Mon-Fri.    [facility], [address] First Floor, [address], [place] [address] (next to the outpatient lab)    Phone: [phone]   Provides bridging services to people with Opiate Use Disorders (OUD) seeking care. This is a front door to Medication Assisted Treatments (MAT), ages 16+  Walk In hours: Monday-Friday 9:00am-3:00pm    [facility]  [phone]  Walk in Assessments: Mon-Friday 7a-1:45p  [address], [place], [address]    [facility] - [facility]  Central Access [phone]  [address] [place] [address], [address]  *by appointment only    [place]  [phone] (phone consultation available )  Locations in: [place], [place], [facility], and [place], [place]  [place] virtual IOP [phone] or visit Peter.Ulmart/ERIBERTO   Also offers LGBTQ programming     [facility]  [phone]  [address] [facility]  [place], [place], [address]  *Currently only offered via telehealth - call to set up an appointment    [facility]  [address] [place], [place], [address]  [facility]  For more information to to make a referral call:  [phone]  Walk-in on   9-11 a.m.    [facility]  [phone]  [facility] [place], [place], [address]  *available by appointments only    [facility] - [place] specific  [phone] [address]  [place], [place], [address]  *available by appointment only    [facility]  [phone]  [address] [place], [place], [address]  *walk in assessments available M-F starting at 7 am.    [facility] Addiction Services  [phone]  Locations: [facility], [facility], and [place]  *Walk in assessments availble M-F starting at 8 am -virtual only    [facility]  [phone]  [address] [place] [address]    [facility]  Virtual + Locations: [place], [place], [place], [place], [facility], [place], [place], [place]   [phone]  *available by appointment only    [facility]  [phone]  [address] [facility] [address]  [place], [address]    [facility] ([facility])  [phone]  [address] [place], [address]  *available by appointment    [facility]  [phone]  [address] [place], [place], [address]  *walk ins available M-TH from 9-3    [facility]  MAT program: [phone]  [address] [place], [place], [address]    [place]  [phone]  Same day substance use disorder assessments are available Monday - Friday, via walk-in or by appointment at the [place] location.  [address], [address], [place] [address]     [facility] - adolescent and adult SUDs services  [phone]  Offer services Monday through Friday, as well as evening hours Monday through Thursday. Normally, a first appointment will be scheduled within one week  https://www.[employer]Energy.com/our-services/drug-alcohol-treatment  Locations all over [place]    If you are intoxicated, you may be required to detox at a detox facility before starting treatment. The following are detox facilities that you can self present to. All detox facilities are able to help you complete an assessment prior to discharge if you choose:    [facility]: Arrive at a [facility] for immediate medical  evaluation    [facility]: [address] [place], [place], [address].         [phone]    [facility]: [address] [place], [place], [address]  [phone]     [facility] ([place] [facility]): [address] [place], [place], [address]  [phone]     [facility]: [address] [facility] [place], [address], [phone]         Ways to help cope with sobriety:    -- Take prescribed medicines as scheduled  -- Keep follow-up appointments  -- Talk to others about your concerns  -- Get regular exercise  -- Practice deep breathing skills  -- Eat a healthy diet  -- Use community resources, including hotline numbers, [facility] crisis and support meetings  -- Stay sober and avoid places/people/things associated with substance use  --Maintain a daily schedule/routine  --Get at least 7-8 hours of sleep per night  --Create a list 10--20 healthy activities that you can do that are enjoyable and do not involve substance use  --Create daily goals (approx. 1-4 goals) per day and work to achieve them throughout the day.       Free Resources:    [place] [facility] ([facility])  [facility] connects people seeking recovery to resources that help foster and sustain long-term recovery. Whether you are seeking resources for treatment, transportation, housing, job training, education, health care or other pathways to recovery, [facility] is a great place to start.  Phone: [phone]. www.[place]nesotaEARTHTORY.org (Great listing of all types of recovery and non-recovery related resources)    Alcoholics Anonymous  Phone: 9-160-ALCOHOL  Website: HTTP://WWW.AA.ORG/  [facility] [place] (104-247-0375 or http://a[place].org)  [facility] [place] (358-476-6582 or www.aastpaul.org)     Narcotics Anonymous  Phone: 674.899.7449  Website: www.Veeip.[facility].    [facility]  [address] [place] [address], 5, [place], [place],  Phone: [phone]  Drop-in Hours: Monday-Friday 9-11:30 am. By appointment at other  times.  Provides: [facility] is a drop-in center on the [facility] that provides a safe space for individuals who are homeless and have a history of chemical use. Sobriety is not a requirement but drugs and alcohol are not allowed on the property.  Services: Non-clients can access drop-in services such as Recovery and Harm Reduction Groups, referrals to case management, community activities, shower facilities, and a pool table. Individuals who are homeless and have chemical health needs may be eligible for enrollment into [facility]'s case management program. Clients and  work together to access benefits, treatment, health care, shelter, and external housing resources.    Date: Friday, 8/16/2024  Time: 3:00 pm - 4:00 pm  Provider: Tasha Washburn  MSN  CNP,PMHNP  Location: [address], [place] [address], [place] [address]  Phone: [phone]  Type: Telepsychiatry    Scheduling Instructions  Please do not schedule same-day or next day appointments. Please allow 2 business days between the time of scheduling and appointment date. This allows us time to confirm the patient s appointment. Appointments must be verified by the [facility] Intake Department. You can assist patients in doing this by calling [phone], after you have made the referral. If the patient does not speak to our intake team, they will be unable to be seen for their scheduled appointment.  Patient Instructions  Before your appointment, you must speak with our Intake Department. Our intake team will attempt to contact you. If you do not hear from them within 24 hours, please call them at [phone] and tell them you are a [facility] referral. If you do not speak with our Intake Department and complete the necessary paperwork they send you, we cannot see you at your scheduled appointment time.    Date: Friday, 8/16/2024  Time: 5:00 pm - 6:00 pm  Provider: Stevie Davalos  Supervised by:  Brissa BREAUX  Location: [facility], [address], [place] [address]  Phone: [phone]  Type: Teletherapy    Scheduling Instructions  PLEASE DO NOT IGNORE THESE INSTRUCTIONS WE ARE UNWILLING TO MEET WITH PATIENTS WHOSE SCHEDULERS DO NOT PROVIDE AN EMAIL ADDRESS,INSURANCE INFO, AND AN ACTIVE PHONE NUMBER.WE ARE ALSO UNWILLING TO MEET WITH PATIENTS WHO DO NOT WANT TO GO TO COUNSELING :/ WE DON T MEAN TO BE RUDE, PLEASE JUST READ NOTES:) PLEASE GET EMAIL ADDRESS FROM CLIENT I work under the supervision of Brissa Fitzgerald MA, LMFT. PLEASE LEAVE AT LEAST A 24 HOUR WINDOW FOR SCHEDULING.WE DO NOT MEET WITH CLIENTS THAT DON T HAVE INS        [facility] - (Male Outpatient & IOP)  [facility]  [address]  [phone]  8/19/2024 4pm

## 2024-09-28 ENCOUNTER — HEALTH MAINTENANCE LETTER (OUTPATIENT)
Age: 29
End: 2024-09-28